# Patient Record
Sex: FEMALE | URBAN - METROPOLITAN AREA
[De-identification: names, ages, dates, MRNs, and addresses within clinical notes are randomized per-mention and may not be internally consistent; named-entity substitution may affect disease eponyms.]

---

## 2019-12-06 ENCOUNTER — OFFICE VISIT (OUTPATIENT)
Dept: URBAN - METROPOLITAN AREA CLINIC 11 | Facility: CLINIC | Age: 29
End: 2019-12-06
Payer: COMMERCIAL

## 2019-12-06 DIAGNOSIS — H52.223 REGULAR ASTIGMATISM, BILATERAL: Primary | ICD-10-CM

## 2019-12-06 PROCEDURE — 92002 INTRM OPH EXAM NEW PATIENT: CPT | Performed by: OPTOMETRIST

## 2019-12-06 ASSESSMENT — VISUAL ACUITY
OS: 20/20
OD: 20/20

## 2019-12-06 ASSESSMENT — KERATOMETRY
OD: 44.75
OS: 45.13

## 2019-12-06 ASSESSMENT — INTRAOCULAR PRESSURE
OS: 15
OD: 15

## 2019-12-06 NOTE — IMPRESSION/PLAN
Impression: Regular astigmatism, bilateral: H52.223. OU. Plan: Refractive error accounts for symptoms. Release SRX. All ocular structures appear healthy today, OU. RTC 1 year x CEE.